# Patient Record
Sex: FEMALE | Race: WHITE | Employment: OTHER | URBAN - METROPOLITAN AREA
[De-identification: names, ages, dates, MRNs, and addresses within clinical notes are randomized per-mention and may not be internally consistent; named-entity substitution may affect disease eponyms.]

---

## 2017-03-30 ENCOUNTER — OFFICE VISIT (OUTPATIENT)
Dept: FAMILY MEDICINE CLINIC | Age: 62
End: 2017-03-30

## 2017-03-30 VITALS
SYSTOLIC BLOOD PRESSURE: 150 MMHG | HEIGHT: 66 IN | DIASTOLIC BLOOD PRESSURE: 80 MMHG | BODY MASS INDEX: 29.57 KG/M2 | WEIGHT: 184 LBS

## 2017-03-30 DIAGNOSIS — I10 ESSENTIAL HYPERTENSION: ICD-10-CM

## 2017-03-30 DIAGNOSIS — E03.4 HYPOTHYROIDISM DUE TO ACQUIRED ATROPHY OF THYROID: ICD-10-CM

## 2017-03-30 DIAGNOSIS — F41.9 ANXIETY: ICD-10-CM

## 2017-03-30 DIAGNOSIS — F32.9 REACTIVE DEPRESSION: Primary | ICD-10-CM

## 2017-03-30 PROCEDURE — 99214 OFFICE O/P EST MOD 30 MIN: CPT | Performed by: FAMILY MEDICINE

## 2017-03-30 RX ORDER — CITALOPRAM 10 MG/1
10 TABLET ORAL DAILY
Qty: 30 TABLET | Refills: 3 | Status: SHIPPED | OUTPATIENT
Start: 2017-03-30 | End: 2017-11-07 | Stop reason: ALTCHOICE

## 2017-03-30 RX ORDER — LISINOPRIL AND HYDROCHLOROTHIAZIDE 20; 12.5 MG/1; MG/1
TABLET ORAL
Qty: 45 TABLET | Refills: 5 | Status: SHIPPED | OUTPATIENT
Start: 2017-03-30 | End: 2017-09-22 | Stop reason: DRUGHIGH

## 2017-03-30 ASSESSMENT — ENCOUNTER SYMPTOMS
GASTROINTESTINAL NEGATIVE: 1
RESPIRATORY NEGATIVE: 1

## 2017-04-08 ENCOUNTER — TELEPHONE (OUTPATIENT)
Dept: FAMILY MEDICINE CLINIC | Age: 62
End: 2017-04-08

## 2017-04-14 ENCOUNTER — OFFICE VISIT (OUTPATIENT)
Dept: FAMILY MEDICINE CLINIC | Age: 62
End: 2017-04-14

## 2017-04-14 VITALS
DIASTOLIC BLOOD PRESSURE: 88 MMHG | WEIGHT: 185.4 LBS | HEIGHT: 66 IN | SYSTOLIC BLOOD PRESSURE: 140 MMHG | BODY MASS INDEX: 29.8 KG/M2

## 2017-04-14 DIAGNOSIS — I10 ESSENTIAL HYPERTENSION: Primary | ICD-10-CM

## 2017-04-14 PROCEDURE — 99214 OFFICE O/P EST MOD 30 MIN: CPT | Performed by: FAMILY MEDICINE

## 2017-04-14 RX ORDER — HYDROCHLOROTHIAZIDE 12.5 MG/1
12.5 TABLET ORAL DAILY
Qty: 30 TABLET | Refills: 2 | Status: SHIPPED | OUTPATIENT
Start: 2017-04-14 | End: 2017-08-10 | Stop reason: SDUPTHER

## 2017-04-14 RX ORDER — LISINOPRIL 40 MG/1
40 TABLET ORAL DAILY
Qty: 30 TABLET | Refills: 3 | Status: SHIPPED | OUTPATIENT
Start: 2017-04-14 | End: 2017-08-10 | Stop reason: SDUPTHER

## 2017-04-14 ASSESSMENT — ENCOUNTER SYMPTOMS
BLURRED VISION: 0
SHORTNESS OF BREATH: 0
ORTHOPNEA: 0

## 2017-05-03 ENCOUNTER — TELEPHONE (OUTPATIENT)
Dept: FAMILY MEDICINE CLINIC | Age: 62
End: 2017-05-03

## 2017-06-29 ENCOUNTER — TELEPHONE (OUTPATIENT)
Dept: FAMILY MEDICINE CLINIC | Age: 62
End: 2017-06-29

## 2017-08-10 ENCOUNTER — TELEPHONE (OUTPATIENT)
Dept: FAMILY MEDICINE CLINIC | Age: 62
End: 2017-08-10

## 2017-08-10 DIAGNOSIS — I10 ESSENTIAL HYPERTENSION: ICD-10-CM

## 2017-08-10 RX ORDER — LISINOPRIL 40 MG/1
TABLET ORAL
Qty: 30 TABLET | Refills: 2 | Status: SHIPPED | OUTPATIENT
Start: 2017-08-10 | End: 2017-11-08 | Stop reason: SDUPTHER

## 2017-08-10 RX ORDER — HYDROCHLOROTHIAZIDE 12.5 MG/1
TABLET ORAL
Qty: 30 TABLET | Refills: 1 | Status: SHIPPED | OUTPATIENT
Start: 2017-08-10 | End: 2017-11-07 | Stop reason: ALTCHOICE

## 2017-09-22 ENCOUNTER — OFFICE VISIT (OUTPATIENT)
Dept: FAMILY MEDICINE CLINIC | Age: 62
End: 2017-09-22

## 2017-09-22 VITALS
HEIGHT: 66 IN | DIASTOLIC BLOOD PRESSURE: 82 MMHG | BODY MASS INDEX: 29.73 KG/M2 | WEIGHT: 185 LBS | SYSTOLIC BLOOD PRESSURE: 176 MMHG

## 2017-09-22 DIAGNOSIS — I10 ESSENTIAL HYPERTENSION: Primary | ICD-10-CM

## 2017-09-22 DIAGNOSIS — Z23 NEED FOR INFLUENZA VACCINATION: ICD-10-CM

## 2017-09-22 PROCEDURE — 99213 OFFICE O/P EST LOW 20 MIN: CPT | Performed by: FAMILY MEDICINE

## 2017-09-22 RX ORDER — TRAZODONE HYDROCHLORIDE 50 MG/1
50 TABLET ORAL NIGHTLY
Qty: 30 TABLET | Refills: 2 | Status: SHIPPED | OUTPATIENT
Start: 2017-09-22 | End: 2017-11-07 | Stop reason: ALTCHOICE

## 2017-09-22 ASSESSMENT — ENCOUNTER SYMPTOMS
SHORTNESS OF BREATH: 0
ORTHOPNEA: 0
BLURRED VISION: 0

## 2017-09-22 NOTE — MR AVS SNAPSHOT
After Visit Summary             Chris Gomse   2017 9:30 AM   Office Visit    Description:  Female : 1955   Provider:  Brooks Gonzalez DO   Department:  [facility]              Your Follow-Up and Future Appointments         Below is a list of your follow-up and future appointments. This may not be a complete list as you may have made appointments directly with providers that we are not aware of or your providers may have made some for you. Please call your providers to confirm appointments. It is important to keep your appointments. Please bring your current insurance card, photo ID, co-pay, and all medication bottles to your appointment. If self-pay, payment is expected at the time of service. Your To-Do List     Follow-Up    Return if symptoms worsen or fail to improve. Information from Your Visit        Department     Name Address Phone Fax    [address] [facility] [address] [phone]      You Were Seen for:         Comments    Essential hypertension   [633170]         Vital Signs     Blood Pressure Height Weight Body Mass Index Smoking Status       176/82 5' 6\" (1.676 m) 185 lb (83.9 kg) 29.86 kg/m2 Never Smoker       Additional Information about your Body Mass Index (BMI)           Your BMI as listed above is considered overweight (25.0-29.9). BMI is an estimate of body fat, calculated from your height and weight. The higher your BMI, the greater your risk of heart disease, high blood pressure, type 2 diabetes, stroke, gallstones, arthritis, sleep apnea, and certain cancers. BMI is not perfect. It may overestimate body fat in athletes and people who are more muscular. If your body fat is high you can improve your BMI by decreasing your calorie intake and becoming more physically active.      Learn more at: [employer] Today's Medication Changes          These changes are accurate as of: 9/22/17 12:35 PM.  If you have any questions, ask your nurse or doctor. START taking these medications           traZODone 50 MG tablet   Commonly known as:  DESYREL   Instructions: Take 1 tablet by mouth nightly   Quantity:  30 tablet   Refills:  2   Started by:  Ann Elise, DO            Where to Get Your Medications      These medications were sent to [address], [address] [place] [address]     Phone:  [phone]     traZODone 50 MG tablet               Your Current Medications Are              traZODone (DESYREL) 50 MG tablet Take 1 tablet by mouth nightly    lisinopril (PRINIVIL;ZESTRIL) 40 MG tablet TAKE ONE TABLET BY MOUTH DAILY    hydrochlorothiazide (HYDRODIURIL) 12.5 MG tablet TAKE ONE TABLET BY MOUTH DAILY    citalopram (CELEXA) 10 MG tablet Take 1 tablet by mouth daily    meloxicam (MOBIC) 15 MG tablet TAKE ONE TABLET BY MOUTH DAILY    atorvastatin (LIPITOR) 40 MG tablet Take 40 mg by mouth daily. levothyroxine (SYNTHROID) 88 MCG tablet Take 88 mcg by mouth daily.         Allergies              Amoxicillin     Erythromycin     Penicillins     Sulfa Antibiotics     Ciprofloxacin Hcl Palpitations    Oxycontin  [Oxycodone Hcl] Nausea And Vomiting      We Ordered/Performed the following           INFLUENZA, QUADV, 3 YRS AND OLDER, IM, PF, PREFILL SYR OR SDV, 0.5ML (FLUZONE QUADV, PF)          Additional Information        Basic Information     Date Of Birth Sex Race Ethnicity Preferred Language    1955 Female White Non-/Non  English      Problem List as of 9/22/2017  Date Reviewed: 4/14/2017          None      Immunizations as of 9/22/2017     Name Date    Influenza, Quadrivalent, 3 yrs and above, IM, Preservative Free 10/6/2016      Preventive Care        Date Due

## 2017-09-22 NOTE — PROGRESS NOTES
Subjective:      Patient ID: Arturo Andrade is a 57 y.o. female. Hypertension   The current episode started more than 1 year ago. The problem has been gradually worsening since onset. The problem is uncontrolled. Associated symptoms include anxiety and malaise/fatigue. Pertinent negatives include no blurred vision, chest pain, headaches, neck pain, orthopnea, palpitations, peripheral edema, PND, shortness of breath or sweats. There are no associated agents to hypertension. Risk factors for coronary artery disease include family history, post-menopausal state and stress. Past treatments include ACE inhibitors and diuretics. The current treatment provides moderate improvement. Compliance problems include psychosocial issues. Diastolic numbers are good but she is very anxious with family, job and financial stresses  Does not sleep well   Review of Systems   Constitutional: Positive for malaise/fatigue. Eyes: Negative for blurred vision. Respiratory: Negative for shortness of breath. Cardiovascular: Negative for chest pain, palpitations, orthopnea and PND. Musculoskeletal: Negative for neck pain. Neurological: Negative for headaches.      YOB: 1955    Date of Visit:  9/22/2017    Allergies   Allergen Reactions    Amoxicillin     Erythromycin     Penicillins     Sulfa Antibiotics     Ciprofloxacin Hcl Palpitations    Oxycontin  [Oxycodone Hcl] Nausea And Vomiting       Outpatient Prescriptions Marked as Taking for the 9/22/17 encounter (Office Visit) with Erika Arboleda,    Medication Sig Dispense Refill    lisinopril (PRINIVIL;ZESTRIL) 40 MG tablet TAKE ONE TABLET BY MOUTH DAILY 30 tablet 2    hydrochlorothiazide (HYDRODIURIL) 12.5 MG tablet TAKE ONE TABLET BY MOUTH DAILY 30 tablet 1    citalopram (CELEXA) 10 MG tablet Take 1 tablet by mouth daily 30 tablet 3    meloxicam (MOBIC) 15 MG tablet TAKE ONE TABLET BY MOUTH DAILY 90 tablet 3    atorvastatin (LIPITOR) 40 MG tablet Take 40 mg by mouth daily.  levothyroxine (SYNTHROID) 88 MCG tablet Take 88 mcg by mouth daily. Vitals:    09/22/17 0927 09/22/17 1038   BP: (!) 179/82 (!) 176/82   Weight: 185 lb (83.9 kg)    Height: 5' 6\" (1.676 m)      Body mass index is 29.86 kg/(m^2). Wt Readings from Last 3 Encounters:   09/22/17 185 lb (83.9 kg)   04/14/17 185 lb 6.4 oz (84.1 kg)   03/30/17 184 lb (83.5 kg)     BP Readings from Last 3 Encounters:   09/22/17 (!) 176/82   04/14/17 (!) 140/88   03/30/17 (!) 150/80       Objective:   Physical Exam   Constitutional: She is oriented to person, place, and time. She appears well-developed and well-nourished. HENT:   Head: Normocephalic. Neck: No thyromegaly present. Cardiovascular: Normal rate, regular rhythm and normal heart sounds. Pulmonary/Chest: Effort normal and breath sounds normal.   Lymphadenopathy:     She has no cervical adenopathy. Neurological: She is alert and oriented to person, place, and time. Psychiatric:   Crying and upset    Nursing note and vitals reviewed. Assessment:      Assessment/plan;  Harjeet Younger was seen today for hypertension. Diagnoses and all orders for this visit:    Essential hypertension    Need for influenza vaccination  -     INFLUENZA, QUADV, 3 YRS AND OLDER, IM, PF, PREFILL SYR OR SDV, 0.5ML (FLUZONE QUADV, PF)    Other orders  -     traZODone (DESYREL) 50 MG tablet;  Take 1 tablet by mouth nightly    reviewed bp logs  Will try treating anxiety and helping with sleep   Will let me know in two weeks how her bp is doing

## 2017-09-22 NOTE — PROGRESS NOTES
Vaccine Information Sheet, \"Influenza - Inactivated\"  given to Bryce Landers, or parent/legal guardian of  Bryce Landers and verbalized understanding. Patient responses:    Have you ever had a reaction to a flu vaccine? No  Are you able to eat eggs without adverse effects? Yes  Do you have any current illness? No  Have you ever had [place] Syndrome? No    Flu vaccine given per order. Please see immunization tab.

## 2017-09-25 ENCOUNTER — TELEPHONE (OUTPATIENT)
Dept: FAMILY MEDICINE CLINIC | Age: 62
End: 2017-09-25

## 2017-10-02 RX ORDER — METOPROLOL SUCCINATE 25 MG/1
25 TABLET, EXTENDED RELEASE ORAL DAILY
Qty: 30 TABLET | Refills: 3 | Status: SHIPPED | OUTPATIENT
Start: 2017-10-02 | End: 2017-11-07 | Stop reason: ALTCHOICE

## 2017-10-30 PROCEDURE — 90471 IMMUNIZATION ADMIN: CPT | Performed by: FAMILY MEDICINE

## 2017-10-30 PROCEDURE — 90686 IIV4 VACC NO PRSV 0.5 ML IM: CPT | Performed by: FAMILY MEDICINE

## 2017-11-07 ENCOUNTER — OFFICE VISIT (OUTPATIENT)
Dept: FAMILY MEDICINE CLINIC | Age: 62
End: 2017-11-07

## 2017-11-07 VITALS
SYSTOLIC BLOOD PRESSURE: 138 MMHG | DIASTOLIC BLOOD PRESSURE: 86 MMHG | WEIGHT: 183 LBS | HEIGHT: 66 IN | BODY MASS INDEX: 29.41 KG/M2 | TEMPERATURE: 99.6 F

## 2017-11-07 DIAGNOSIS — I10 ESSENTIAL HYPERTENSION: Primary | ICD-10-CM

## 2017-11-07 PROCEDURE — 99213 OFFICE O/P EST LOW 20 MIN: CPT | Performed by: FAMILY MEDICINE

## 2017-11-07 RX ORDER — CLONIDINE HYDROCHLORIDE 0.1 MG/1
0.1 TABLET ORAL 2 TIMES DAILY
Qty: 60 TABLET | Refills: 3 | Status: SHIPPED | OUTPATIENT
Start: 2017-11-07

## 2017-11-07 RX ORDER — AZITHROMYCIN 250 MG/1
TABLET, FILM COATED ORAL
Qty: 1 PACKET | Refills: 1 | Status: SHIPPED | OUTPATIENT
Start: 2017-11-07 | End: 2017-11-17

## 2017-11-07 ASSESSMENT — ENCOUNTER SYMPTOMS
BLURRED VISION: 0
ORTHOPNEA: 0
SHORTNESS OF BREATH: 0

## 2017-11-07 NOTE — PROGRESS NOTES
tablet Take 88 mcg by mouth daily. Vitals:    11/07/17 1123   BP: (!) 176/92   Temp: 99.6 °F (37.6 °C)   Weight: 183 lb (83 kg)   Height: 5' 6\" (1.676 m)     Body mass index is 29.54 kg/m². Wt Readings from Last 3 Encounters:   11/07/17 183 lb (83 kg)   09/22/17 185 lb (83.9 kg)   04/14/17 185 lb 6.4 oz (84.1 kg)     BP Readings from Last 3 Encounters:   11/07/17 (!) 176/92   09/22/17 (!) 176/82   04/14/17 (!) 140/88       Objective:   Physical Exam   Constitutional: She is oriented to person, place, and time. She appears well-developed and well-nourished. HENT:   Head: Normocephalic. Neck: No thyromegaly present. Cardiovascular: Normal rate, regular rhythm and normal heart sounds. Pulmonary/Chest: Effort normal and breath sounds normal.   Lymphadenopathy:     She has no cervical adenopathy. Neurological: She is alert and oriented to person, place, and time. Psychiatric: She has a normal mood and affect. Her behavior is normal. Judgment and thought content normal.   Nursing note and vitals reviewed. Assessment:      Assessment/plan;  Dimitri Prieto was seen today for hypertension and sinusitis. Diagnoses and all orders for this visit:    Essential hypertension    Other orders  -     azithromycin (ZITHROMAX) 250 MG tablet; Take 2 tabs (500 mg) on Day 1, and take 1 tab (250 mg) on days 2 through 5.  -     cloNIDine (CATAPRES) 0.1 MG tablet; Take 1 tablet by mouth 2 times daily      Return in about 3 months (around 2/7/2018).

## 2017-11-08 DIAGNOSIS — I10 ESSENTIAL HYPERTENSION: ICD-10-CM

## 2017-11-08 RX ORDER — LISINOPRIL 40 MG/1
TABLET ORAL
Qty: 30 TABLET | Refills: 1 | Status: SHIPPED | OUTPATIENT
Start: 2017-11-08 | End: 2018-01-03 | Stop reason: SDUPTHER

## 2017-12-03 DIAGNOSIS — M15.9 PRIMARY OSTEOARTHRITIS INVOLVING MULTIPLE JOINTS: ICD-10-CM

## 2017-12-04 RX ORDER — MELOXICAM 15 MG/1
TABLET ORAL
Qty: 90 TABLET | Refills: 2 | Status: SHIPPED | OUTPATIENT
Start: 2017-12-04 | End: 2020-10-15

## 2017-12-14 ENCOUNTER — TELEPHONE (OUTPATIENT)
Dept: FAMILY MEDICINE CLINIC | Age: 62
End: 2017-12-14

## 2017-12-14 DIAGNOSIS — R07.89 ATYPICAL CHEST PAIN: ICD-10-CM

## 2017-12-14 DIAGNOSIS — I10 HYPERTENSION, UNSPECIFIED TYPE: Primary | ICD-10-CM

## 2017-12-14 NOTE — TELEPHONE ENCOUNTER
Pt would like a return call about the stress test that Dr Rosa Elena Ferguson is ordering for her to have. She does not want to send message back and forth any more.  She would like Jennyfer to call her with details of this stress test. Please give pt a call  [phone]

## 2017-12-18 ENCOUNTER — TELEPHONE (OUTPATIENT)
Dept: FAMILY MEDICINE CLINIC | Age: 62
End: 2017-12-18

## 2017-12-18 NOTE — TELEPHONE ENCOUNTER
Order stress test verbal from Dr Radha Draper,   Faxed to [facility] per pts mychart message  Informed pt in [place]

## 2017-12-18 NOTE — TELEPHONE ENCOUNTER
[facility] is calling because they need an order for a stress test. They need to know if it is a walking stress test or chemical.

## 2017-12-21 ENCOUNTER — TELEPHONE (OUTPATIENT)
Dept: FAMILY MEDICINE CLINIC | Age: 62
End: 2017-12-21

## 2017-12-21 DIAGNOSIS — R07.89 OTHER CHEST PAIN: Primary | ICD-10-CM

## 2017-12-21 NOTE — TELEPHONE ENCOUNTER
Pt stated she need order changed to chemical and not treadmill stress test since she is having a knee replacement soon and is \"bone on bone\" in her left knee. I spoke with Dr. Columba Lopes and the order was updated. I faxed the order to [facility] facility at [phone]. I left a voicemail message for Angelica Cockayne (nurse there) to call me back with any questions. I also left a voice mail message for Ms. Jocelynn Miller after I had done all of this to let her know that he order had been sent.

## 2020-10-15 RX ORDER — HYDRALAZINE HYDROCHLORIDE 25 MG/1
25 TABLET, FILM COATED ORAL 3 TIMES DAILY
COMMUNITY

## 2020-10-15 NOTE — FLOWSHEET NOTE
[facility] time________10/22/20 1330____        Surgery time__________1430__    Take the following medications with a sip of water:    Do not eat or drink anything after 12:00 midnight prior to your surgery. This includes water chewing gum, mints and ice chips. You may brush your teeth and gargle the morning of your surgery, but do not swallow the water     Please see your family doctor/pediatrician for a history and physical and/or concerning medications. Bring any test results/reports from your physicians office. If you are under the care of a heart doctor or specialist doctor, please be aware that you may be asked to them for clearance    You may be asked to stop blood thinners such as Coumadin, Plavix, Fragmin, Lovenox, etc., or any anti-inflammatories such as:  Aspirin, Ibuprofen, Advil, Naproxen prior to your surgery. We also ask that you stop any OTC medications such as fish oil, vitamin E, glucosamine, garlic, Multivitamins, COQ 10, etc.    We ask that you do not smoke 24 hours prior to surgery  We ask that you do not  drink any alcoholic beverages 24 hours prior to surgery     You must make arrangements for a responsible adult to take you home after your surgery. For your safety you will not be allowed to leave alone or drive yourself home. Your surgery will be cancelled if you do not have a ride home. Also for your safety, it is strongly suggested that someone stay with you the first 24 hours after your surgery. A parent or legal guardian must accompany a child scheduled for surgery and plan to stay at the hospital until the child is discharged. Please do not bring other children with you. For your comfort, please wear simple loose fitting clothing to the hospital.  Please do not bring valuables.     Do not wear any make-up or nail polish on your fingers or toes      For your safety, please do not wear any jewelry or body piercing's on the day of surgery. All jewelry must be removed. If you have dentures, they will be removed before going to operating room. For your convenience, we will provide you with a container. If you wear contact lenses or glasses, they will be removed, please bring a case for them. If you have a living will and a durable power of  for healthcare, please bring in a copy. As part of our patient safety program to minimize surgical site infections, we ask you to do the following:    · Please notify your surgeon if you develop any illness between         now and the  day of your surgery. · This includes a cough, cold, fever, sore throat, nausea,         or vomiting, and diarrhea, etc.  ·  Please notify your surgeon if you experience dizziness, shortness         of breath or blurred vision between now and the time of your surgery. Do not shave your operative site 96 hours prior to surgery. For face and neck surgery, men may use an electric razor 48 hours   prior to surgery. You may shower the night before surgery or the morning of   your surgery with an antibacterial soap. You will need to bring a photo ID and insurance card    [facility] has an onsite pharmacy, would you like to utilize our pharmacy     If you will be staying overnight and use a C-pap machine, please bring   your C-pap to hospital     Our goal is to provide you with excellent care, therefore, visitors will be limited to two(2) in the room at a time so that we may focus on providing this care for you. Please contact pre-admission testing if you have any further questions. [facility] phone number:  [phone]  Please note these are generalized instructions for all surgical cases, you may be provided with more specific instructions according to your surgery.

## 2020-10-15 NOTE — FLOWSHEET NOTE
Preoperative Screening for Elective Surgery/Invasive Procedures While COVID-19 present in the community     Have you tested positive or have been told to self-isolate for COVID-19 like symptoms within the past 28 days?  Do you currently have any of the following symptoms? o Fever >100.0 F or 99.9 F in immunocompromised patients? o New onset cough, shortness of breath or difficulty breathing?  o New onset sore throat, myalgia (muscle aches and pains), headache, loss of taste/smell or diarrhea?  Have you had a potential exposure to COVID-19 within the past 14 days by:  o Close contact with a confirmed case? o Close contact with a healthcare worker,  or essential infrastructure worker (grocery store, [employer], gas station, public utilities or transportation)? o Do you reside in a congregate setting such as; skilled nursing facility, adult home, correctional facility, homeless shelter or other institutional setting?  o Have you had recent travel to a known COVID-19 hotspot? Indicate if the patient has a positive screen by answering yes to one or more of the above questions. Patients who test positive or screen positive prior to surgery or on the day of surgery should be evaluated in conjunction with the surgeon/proceduralist/anesthesiologist to determine the urgency of the procedure.     no to all

## 2020-10-16 ENCOUNTER — OFFICE VISIT (OUTPATIENT)
Dept: PRIMARY CARE CLINIC | Age: 65
End: 2020-10-16
Payer: MEDICARE

## 2020-10-16 LAB — SARS-COV-2, PCR: NOT DETECTED

## 2020-10-16 PROCEDURE — 99211 OFF/OP EST MAY X REQ PHY/QHP: CPT | Performed by: NURSE PRACTITIONER

## 2020-10-16 NOTE — PROGRESS NOTES
Patient presented to [facility] drive up clinic for preop testing. Patient was swabbed and given information advising them to remain isolated until procedure date.

## 2020-10-19 NOTE — RESULT ENCOUNTER NOTE

## 2020-10-21 ENCOUNTER — ANESTHESIA EVENT (OUTPATIENT)
Dept: ENDOSCOPY | Age: 65
End: 2020-10-21
Payer: MEDICARE

## 2020-10-22 ENCOUNTER — ANESTHESIA (OUTPATIENT)
Dept: ENDOSCOPY | Age: 65
End: 2020-10-22
Payer: MEDICARE

## 2020-10-22 ENCOUNTER — HOSPITAL ENCOUNTER (OUTPATIENT)
Age: 65
Setting detail: OUTPATIENT SURGERY
Discharge: HOME | End: 2020-10-22
Attending: INTERNAL MEDICINE | Admitting: INTERNAL MEDICINE
Payer: MEDICARE

## 2020-10-22 VITALS
RESPIRATION RATE: 16 BRPM | TEMPERATURE: 97.7 F | OXYGEN SATURATION: 98 % | SYSTOLIC BLOOD PRESSURE: 127 MMHG | HEIGHT: 66 IN | BODY MASS INDEX: 30.7 KG/M2 | WEIGHT: 191.06 LBS | HEART RATE: 67 BPM | DIASTOLIC BLOOD PRESSURE: 83 MMHG

## 2020-10-22 VITALS
RESPIRATION RATE: 16 BRPM | OXYGEN SATURATION: 100 % | SYSTOLIC BLOOD PRESSURE: 127 MMHG | DIASTOLIC BLOOD PRESSURE: 72 MMHG | TEMPERATURE: 99.1 F

## 2020-10-22 PROCEDURE — 7100000010 HC PHASE II RECOVERY - FIRST 15 MIN: Performed by: INTERNAL MEDICINE

## 2020-10-22 PROCEDURE — 3700000001 HC ADD 15 MINUTES (ANESTHESIA): Performed by: INTERNAL MEDICINE

## 2020-10-22 PROCEDURE — 2580000003 HC RX 258: Performed by: ANESTHESIOLOGY

## 2020-10-22 PROCEDURE — 88305 TISSUE EXAM BY PATHOLOGIST: CPT

## 2020-10-22 PROCEDURE — 3609010600 HC COLONOSCOPY POLYPECTOMY SNARE/COLD BIOPSY: Performed by: INTERNAL MEDICINE

## 2020-10-22 PROCEDURE — 3700000000 HC ANESTHESIA ATTENDED CARE: Performed by: INTERNAL MEDICINE

## 2020-10-22 PROCEDURE — 6360000002 HC RX W HCPCS

## 2020-10-22 PROCEDURE — 7100000011 HC PHASE II RECOVERY - ADDTL 15 MIN: Performed by: INTERNAL MEDICINE

## 2020-10-22 PROCEDURE — 2500000003 HC RX 250 WO HCPCS

## 2020-10-22 PROCEDURE — 2709999900 HC NON-CHARGEABLE SUPPLY: Performed by: INTERNAL MEDICINE

## 2020-10-22 RX ORDER — MIDAZOLAM HYDROCHLORIDE 1 MG/ML
INJECTION INTRAMUSCULAR; INTRAVENOUS PRN
Status: DISCONTINUED | OUTPATIENT
Start: 2020-10-22 | End: 2020-10-22 | Stop reason: SDUPTHER

## 2020-10-22 RX ORDER — LIDOCAINE HYDROCHLORIDE 20 MG/ML
INJECTION, SOLUTION EPIDURAL; INFILTRATION; INTRACAUDAL; PERINEURAL PRN
Status: DISCONTINUED | OUTPATIENT
Start: 2020-10-22 | End: 2020-10-22 | Stop reason: SDUPTHER

## 2020-10-22 RX ORDER — PROPOFOL 10 MG/ML
INJECTION, EMULSION INTRAVENOUS PRN
Status: DISCONTINUED | OUTPATIENT
Start: 2020-10-22 | End: 2020-10-22 | Stop reason: SDUPTHER

## 2020-10-22 RX ORDER — SODIUM CHLORIDE 9 MG/ML
INJECTION, SOLUTION INTRAVENOUS CONTINUOUS
Status: DISCONTINUED | OUTPATIENT
Start: 2020-10-22 | End: 2020-10-22 | Stop reason: HOSPADM

## 2020-10-22 RX ORDER — SODIUM CHLORIDE 0.9 % (FLUSH) 0.9 %
10 SYRINGE (ML) INJECTION EVERY 12 HOURS SCHEDULED
Status: DISCONTINUED | OUTPATIENT
Start: 2020-10-22 | End: 2020-10-22 | Stop reason: HOSPADM

## 2020-10-22 RX ORDER — SODIUM CHLORIDE 0.9 % (FLUSH) 0.9 %
10 SYRINGE (ML) INJECTION PRN
Status: DISCONTINUED | OUTPATIENT
Start: 2020-10-22 | End: 2020-10-22 | Stop reason: HOSPADM

## 2020-10-22 RX ADMIN — PROPOFOL 80 MG: 10 INJECTION, EMULSION INTRAVENOUS at 14:50

## 2020-10-22 RX ADMIN — LIDOCAINE HYDROCHLORIDE 100 MG: 20 INJECTION, SOLUTION EPIDURAL; INFILTRATION; INTRACAUDAL; PERINEURAL at 14:50

## 2020-10-22 RX ADMIN — PROPOFOL 40 MG: 10 INJECTION, EMULSION INTRAVENOUS at 15:00

## 2020-10-22 RX ADMIN — PROPOFOL 40 MG: 10 INJECTION, EMULSION INTRAVENOUS at 15:05

## 2020-10-22 RX ADMIN — SODIUM CHLORIDE: 9 INJECTION, SOLUTION INTRAVENOUS at 14:07

## 2020-10-22 RX ADMIN — MIDAZOLAM 2 MG: 1 INJECTION INTRAMUSCULAR; INTRAVENOUS at 14:50

## 2020-10-22 RX ADMIN — PROPOFOL 40 MG: 10 INJECTION, EMULSION INTRAVENOUS at 14:55

## 2020-10-22 ASSESSMENT — PAIN SCALES - GENERAL
PAINLEVEL_OUTOF10: 0

## 2020-10-22 ASSESSMENT — PAIN - FUNCTIONAL ASSESSMENT
PAIN_FUNCTIONAL_ASSESSMENT: 0-10
PAIN_FUNCTIONAL_ASSESSMENT: ACTIVITIES ARE NOT PREVENTED

## 2020-10-22 ASSESSMENT — ENCOUNTER SYMPTOMS: SHORTNESS OF BREATH: 0

## 2020-10-22 ASSESSMENT — PAIN DESCRIPTION - DESCRIPTORS: DESCRIPTORS: TENDER

## 2020-10-22 NOTE — H&P
[facility]   Pre-operative History and Physical    Patient: Carmen Enrique  : 1955  Acct#: [de-identified]    History Obtained From: electronic medical record    HISTORY OF PRESENT ILLNESS  Procedure:Colonoscopy  Indications:anemia  Past Medical History:        Diagnosis Date    Anemia     Headache(784.0)     migraine    High blood pressure     Hyperlipidemia     Hypothyroidism     Osteoarthritis     Thyroid trouble      Past Surgical History:        Procedure Laterality Date    COLONOSCOPY      JOINT REPLACEMENT Right     knee    JOINT REPLACEMENT Left     hip     Medications prior to admission:   Prior to Admission medications    Medication Sig Start Date End Date Taking? Authorizing Provider   hydrALAZINE (APRESOLINE) 25 MG tablet Take 25 mg by mouth 3 times daily   Yes Historical Provider, MD   hydrochlorothiazide (HYDRODIURIL) 12.5 MG tablet TAKE ONE TABLET BY MOUTH DAILY 18  Yes Jamal Abraham DO   lisinopril (PRINIVIL;ZESTRIL) 40 MG tablet TAKE ONE TABLET BY MOUTH DAILY 1/3/18  Yes Jamal Abraham DO   cloNIDine (CATAPRES) 0.1 MG tablet Take 1 tablet by mouth 2 times daily 17  Yes Jamal Abraham DO   atorvastatin (LIPITOR) 40 MG tablet Take 40 mg by mouth daily. Yes Historical Provider, MD   levothyroxine (SYNTHROID) 88 MCG tablet Take 88 mcg by mouth daily. Yes Historical Provider, MD     Allergies:   Diclofenac; Amoxicillin; Erythromycin;  Penicillins; Sulfa antibiotics; Ciprofloxacin hcl; and Oxycontin  [oxycodone hcl]    Social History     Socioeconomic History    Marital status:      Spouse name: Not on file    Number of children: 1    Years of education: Not on file    Highest education level: Not on file   Occupational History    Occupation:    Social Needs    Financial resource strain: Not on file    Food insecurity     Worry: Not on file     Inability: Not on file   [employer] needs     Medical: Not on file Non-medical: Not on file   Tobacco Use    Smoking status: Never Smoker    Smokeless tobacco: Never Used    Tobacco comment: encouraged to never smoke   Substance and Sexual Activity    Alcohol use: No     Alcohol/week: 0.0 standard drinks    Drug use: No    Sexual activity: Not Currently   Lifestyle    Physical activity     Days per week: Not on file     Minutes per session: Not on file    Stress: Not on file   Relationships    Social connections     Talks on phone: Not on file     Gets together: Not on file     Attends [employer] service: Not on file     Active member of club or organization: Not on file     Attends meetings of clubs or organizations: Not on file     Relationship status: Not on file    Intimate partner violence     Fear of current or ex partner: Not on file     Emotionally abused: Not on file     Physically abused: Not on file     Forced sexual activity: Not on file   Other Topics Concern    Not on file   Social History Narrative    Not on file     Family History   Problem Relation Age of Onset    Diabetes Mother    [place] Arthritis Mother         rheumatoid    Arthritis Father     Arthritis Other     Diabetes Other     High Blood Pressure Other     Stroke Other          PHYSICAL EXAM:      BP (!) 153/80   Pulse 97   Temp 99.1 °F (37.3 °C) (Temporal)   Resp 16   Ht 5' 6\" (1.676 m)   Wt 191 lb 1 oz (86.7 kg)   SpO2 99%   BMI 30.84 kg/m²  I        Heart:normal    Lungs: normal    Abdomen: normal      ASA Grade:  See anesthesia note      ASSESSMENT AND PLAN:    1. Procedure options, risks and benefits reviewed with patient and expresses understanding.

## 2020-10-22 NOTE — ANESTHESIA PRE PROCEDURE
[facility] Department of Anesthesiology  Pre-Anesthesia Evaluation/Consultation       Name:  Yonathan Dhillon  : 1955  Age:  77 y.o. MRN:  [id number]  Date: 10/22/2020           Surgeon: Surgeon(s):  Lloyd Fleming MD    Procedure: Procedure(s):  COLONOSCOPY DIAGNOSTIC     Allergies   Allergen Reactions    Diclofenac      HTN, Heart palpitations     Amoxicillin     Erythromycin     Penicillins     Sulfa Antibiotics     Ciprofloxacin Hcl Palpitations    Oxycontin  [Oxycodone Hcl] Nausea And Vomiting     There is no problem list on file for this patient. Past Medical History:   Diagnosis Date    Anemia     Headache(784.0)     migraine    High blood pressure     Hyperlipidemia     Hypothyroidism     Osteoarthritis     Thyroid trouble      Past Surgical History:   Procedure Laterality Date    COLONOSCOPY      JOINT REPLACEMENT Right     knee    JOINT REPLACEMENT Left     hip     Social History     Tobacco Use    Smoking status: Never Smoker    Smokeless tobacco: Never Used    Tobacco comment: encouraged to never smoke   Substance Use Topics    Alcohol use: No     Alcohol/week: 0.0 standard drinks    Drug use: No     Medications  No current facility-administered medications on file prior to encounter. Current Outpatient Medications on File Prior to Encounter   Medication Sig Dispense Refill    hydrALAZINE (APRESOLINE) 25 MG tablet Take 25 mg by mouth 3 times daily      hydrochlorothiazide (HYDRODIURIL) 12.5 MG tablet TAKE ONE TABLET BY MOUTH DAILY 30 tablet 5    lisinopril (PRINIVIL;ZESTRIL) 40 MG tablet TAKE ONE TABLET BY MOUTH DAILY 30 tablet 2    cloNIDine (CATAPRES) 0.1 MG tablet Take 1 tablet by mouth 2 times daily 60 tablet 3    atorvastatin (LIPITOR) 40 MG tablet Take 40 mg by mouth daily.  levothyroxine (SYNTHROID) 88 MCG tablet Take 88 mcg by mouth daily.          Current Facility-Administered Medications   Medication Dose Route Frequency Provider Last Rate Last Dose    0.9 % sodium chloride infusion   Intravenous Continuous Radha Moses  mL/hr at 10/22/20 1407      sodium chloride flush 0.9 % injection 10 mL  10 mL Intravenous 2 times per day Radha Moses MD        sodium chloride flush 0.9 % injection 10 mL  10 mL Intravenous PRN Radha Moses MD         Vital Signs (Current)   Vitals:    10/15/20 0948 10/22/20 1358   BP:  (!) 153/80   Pulse:  97   Resp:  16   Temp:  99.1 °F (37.3 °C)   TempSrc:  Temporal   SpO2:  99%   Weight: 196 lb (88.9 kg) 191 lb 1 oz (86.7 kg)   Height: 5' 6\" (1.676 m) 5' 6\" (1.676 m)                                          BP Readings from Last 3 Encounters:   10/22/20 (!) 153/80   17 138/86   17 (!) 176/82     Vital Signs Statistics (for past 48 hrs)     Temp  Av.1 °F (37.3 °C)  Min: 99.1 °F (37.3 °C)   Min taken time: 10/22/20 1358  Max: 99.1 °F (37.3 °C)   Max taken time: 10/22/20 1358  Pulse  Av  Min: 97   Min taken time: 10/22/20 1358  Max: 97   Max taken time: 10/22/20 1358  Resp  Av  Min: 16   Min taken time: 10/22/20 1358  Max: 16   Max taken time: 10/22/20 1358  BP  Min: 153/80   Min taken time: 10/22/20 1358  Max: 153/80   Max taken time: 10/22/20 1358  SpO2  Av %  Min: 99 %   Min taken time: 10/22/20 1358  Max: 99 %   Max taken time: 10/22/20 1358  BP Readings from Last 3 Encounters:   10/22/20 (!) 153/80   17 138/86   17 (!) 176/82       BMI  Body mass index is 30.84 kg/m². Estimated body mass index is 30.84 kg/m² as calculated from the following:    Height as of this encounter: 5' 6\" (1.676 m). Weight as of this encounter: 191 lb 1 oz (86.7 kg).     CBC No results found for: WBC, RBC, HGB, HCT, MCV, RDW, PLT  CMP  No results found for: NA, K, CL, CO2, BUN, CREATININE, GFRAA, AGRATIO, LABGLOM, GLUCOSE, PROT, CALCIUM, BILITOT, ALKPHOS, AST, ALT  BMP  No results found for: NA, K, CL, CO2, BUN, CREATININE, CALCIUM, GFRAA, LABGLOM, GLUCOSE  POCGlucose  No results for input(s): GLUCOSE in the last 72 hours. Coags  No results found for: PROTIME, INR, APTT  HCG (If Applicable) No results found for: PREGTESTUR, PREGSERUM, HCG, HCGQUANT   ABGs No results found for: PHART, PO2ART, RHF8OZM, KPG2AEV, BEART, O6VQTXUP   Type & Screen (If Applicable)  No results found for: LABABO, LABRH                         BMI: Wt Readings from Last 3 Encounters:       NPO Status:   Date of last liquid consumption: 10/22/20   Time of last liquid consumption: 0830   Date of last solid food consumption: 10/21/20      Time of last solid consumption: 0800       Anesthesia Evaluation  Patient summary reviewed  Airway: Mallampati: III  TM distance: >3 FB   Neck ROM: full  Mouth opening: > = 3 FB Dental:    (+) partials      Pulmonary:       (-) COPD, asthma and shortness of breath                           Cardiovascular:    (+) hypertension:, hyperlipidemia    (-) valvular problems/murmurs, past MI, CAD, CABG/stent, dysrhythmias and  angina                Neuro/Psych:   (+) headaches: migraine headaches,    (-) seizures, TIA and CVA           GI/Hepatic/Renal:        (-) GERD, PUD, liver disease and no renal disease       Endo/Other:    (+) hypothyroidism: arthritis:., .    (-) diabetes mellitus               Abdominal:           Vascular: negative vascular ROS. Anesthesia Plan      MAC     ASA 2     (I discussed intravenous sedation to the patient's satisfaction including risks and alternatives. The patient agreed with the plan and has no further questions. BUDDY REYES )  Induction: intravenous. Anesthetic plan and risks discussed with patient. Plan discussed with CRNA. This pre-anesthesia assessment may be used as a history and physical.    DOS STAFF ADDENDUM:    Pt seen and examined, chart reviewed (including anesthesia, drug and allergy history).   No interval changes to history and

## 2020-10-22 NOTE — PROCEDURES
[facility]  Endoscopy Note    Patient: Lei Ivan  : 1955  Acct#: [de-identified]    Procedure: Colonoscopy with biopsy    Date:  10/22/2020    Surgeon:  Jaime Medellin MD    Referring Physician:  Kana Matson    Previous Colonoscopy: Yes  Date: 09  Greater than 3 years? Yes    Preoperative Diagnosis:  anemia    Postoperative Diagnosis:  Colon polyp    Anesthesia:  See anesthesia note    Indications: This is a 77y.o. year old female who presents today with iron deficiency anemia. Procedure: An informed consent was obtained from the patient after explanation of indications, benefits, possible risks and complications of the procedure. The patient was then taken to the endoscopy suite, placed in the left lateral decubitus position, and the above IV anesthesia was administered. A digital rectal examination was performed and revealed negative without mass, lesions or tenderness. The Olympus CFQ-180-AL video colonoscope was placed in the patient's rectum under digital direction and advanced to the cecum. The cecum was identified by characteristic anatomy and ballottment. The ileocecal valve was identified. The preparation was excellent. The scope was then withdrawn back through the cecum, ascending, transverse, descending and sigmoid colons. Carefull circumferential examination of the mucosa in these areas demonstrated a 2 mm polyp in the ascending colon that was biopsied and removed. The scope was then withdrawn into the rectum and retroflexed. The retroflexed view of the anal verge and rectum demonstrates no abnormalities. The scope was straightened, the colon was decompressed and the scope was withdrawn from the patient. The patient tolerated the procedure well and was taken to the PACU in good condition. Estimated Blood Loss:  none    Impression: Colon polyp    Recommendations:  Await pathology. Repeat colonoscopy in 5 years. Schedule EGD.     Jaime Medellin MD

## 2020-11-04 NOTE — ANESTHESIA POSTPROCEDURE EVALUATION
Department of Anesthesiology  Postprocedure Note    Patient: Damian Merino  MRN: [id number]  YOB: 1955  Date of evaluation: 11/4/2020  Time:  9:34 AM     Procedure Summary     Date:  10/22/20 Room / Location:  [address] [place] [address]    Anesthesia Start:  7586 Anesthesia Stop:  2382    Procedure:  COLONOSCOPY POLYPECTOMY SNARE/COLD BIOPSY (N/A ) Diagnosis:       Iron deficiency anemia, unspecified iron deficiency anemia type      (IRON DEFICIENCY ANEMIA)    Surgeon:  Rosalind Jefferson MD Responsible Provider:  Pat Montgomery MD    Anesthesia Type:  MAC ASA Status:  2          Anesthesia Type: MAC    Papo Phase I: Papo Score: 10    Papo Phase II: Papo Score: 10    Last vitals: Reviewed and per EMR flowsheets. Anesthesia Post Evaluation    Patient location during evaluation: PACU  Level of consciousness: awake and alert  Airway patency: patent  Nausea & Vomiting: no nausea and no vomiting  Complications: no  Cardiovascular status: blood pressure returned to baseline  Respiratory status: acceptable  Hydration status: euvolemic  Comments: Postoperative Anesthesia Note    Name:    Damian Merino  MRN:      [id number]    Empty flowsheet group. LABS:    CBC  No results found for: WBC, HGB, HCT, PLT  RENAL  No results found for: NA, K, CL, CO2, BUN, CREATININE, GLUCOSE  COAGS  No results found for: PROTIME, INR, APTT    Intake & Output:  @05KKHN@    Nausea & Vomiting:  No    Level of Consciousness:  Awake    Pain Assessment:  Adequate analgesia    Anesthesia Complications:  No apparent anesthetic complications    SUMMARY      Vital signs stable  OK to discharge from Stage I post anesthesia care.   Care transferred from Anesthesiology department on discharge from perioperative area

## 2021-03-02 ENCOUNTER — IMMUNIZATION (OUTPATIENT)
Dept: PRIMARY CARE CLINIC | Age: 66
End: 2021-03-02
Payer: MEDICARE

## 2021-03-02 PROCEDURE — 0001A COVID-19, PFIZER VACCINE 30MCG/0.3ML DOSE: CPT | Performed by: FAMILY MEDICINE

## 2021-03-02 PROCEDURE — 91300 COVID-19, PFIZER VACCINE 30MCG/0.3ML DOSE: CPT | Performed by: FAMILY MEDICINE

## 2021-03-23 ENCOUNTER — IMMUNIZATION (OUTPATIENT)
Dept: PRIMARY CARE CLINIC | Age: 66
End: 2021-03-23
Payer: MEDICARE

## 2021-03-23 PROCEDURE — 0002A COVID-19, PFIZER VACCINE 30MCG/0.3ML DOSE: CPT | Performed by: NURSE PRACTITIONER

## 2021-03-23 PROCEDURE — 91300 COVID-19, PFIZER VACCINE 30MCG/0.3ML DOSE: CPT | Performed by: NURSE PRACTITIONER

## 2021-06-02 ENCOUNTER — CLINICAL DOCUMENTATION (OUTPATIENT)
Dept: OTHER | Age: 66
End: 2021-06-02

## (undated) DEVICE — FORCEPS BX 240CM 2.4MM L NDL RAD JAW 4 M00513334